# Patient Record
Sex: FEMALE | Race: WHITE | Employment: FULL TIME
[De-identification: names, ages, dates, MRNs, and addresses within clinical notes are randomized per-mention and may not be internally consistent; named-entity substitution may affect disease eponyms.]

---

## 2017-06-10 ENCOUNTER — HEALTH MAINTENANCE LETTER (OUTPATIENT)
Age: 22
End: 2017-06-10

## 2021-03-03 ENCOUNTER — HOSPITAL ENCOUNTER (EMERGENCY)
Facility: CLINIC | Age: 26
Discharge: HOME | End: 2021-03-03
Attending: EMERGENCY MEDICINE | Admitting: EMERGENCY MEDICINE
Payer: COMMERCIAL

## 2021-03-03 ENCOUNTER — APPOINTMENT (OUTPATIENT)
Dept: ULTRASOUND IMAGING | Facility: CLINIC | Age: 26
End: 2021-03-03
Attending: EMERGENCY MEDICINE
Payer: COMMERCIAL

## 2021-03-03 VITALS
SYSTOLIC BLOOD PRESSURE: 114 MMHG | RESPIRATION RATE: 18 BRPM | OXYGEN SATURATION: 97 % | TEMPERATURE: 98.8 F | DIASTOLIC BLOOD PRESSURE: 83 MMHG | HEART RATE: 91 BPM

## 2021-03-03 LAB
B-HCG SERPL-ACNC: ABNORMAL IU/L (ref 0–5)
BASOPHILS # BLD AUTO: 0 10E9/L (ref 0–0.2)
BASOPHILS NFR BLD AUTO: 0.3 %
DIFFERENTIAL METHOD BLD: ABNORMAL
EOSINOPHIL # BLD AUTO: 0.1 10E9/L (ref 0–0.7)
EOSINOPHIL NFR BLD AUTO: 0.5 %
ERYTHROCYTE [DISTWIDTH] IN BLOOD BY AUTOMATED COUNT: 12.3 % (ref 10–15)
HCT VFR BLD AUTO: 38.5 % (ref 35–47)
HGB BLD-MCNC: 12.6 G/DL (ref 11.7–15.7)
IMM GRANULOCYTES # BLD: 0.1 10E9/L (ref 0–0.4)
IMM GRANULOCYTES NFR BLD: 0.5 %
LYMPHOCYTES # BLD AUTO: 2.7 10E9/L (ref 0.8–5.3)
LYMPHOCYTES NFR BLD AUTO: 23.3 %
MCH RBC QN AUTO: 29.6 PG (ref 26.5–33)
MCHC RBC AUTO-ENTMCNC: 32.7 G/DL (ref 31.5–36.5)
MCV RBC AUTO: 91 FL (ref 78–100)
MONOCYTES # BLD AUTO: 1 10E9/L (ref 0–1.3)
MONOCYTES NFR BLD AUTO: 8.8 %
NEUTROPHILS # BLD AUTO: 7.8 10E9/L (ref 1.6–8.3)
NEUTROPHILS NFR BLD AUTO: 66.6 %
NRBC # BLD AUTO: 0 10*3/UL
NRBC BLD AUTO-RTO: 0 /100
PLATELET # BLD AUTO: 200 10E9/L (ref 150–450)
RBC # BLD AUTO: 4.25 10E12/L (ref 3.8–5.2)
WBC # BLD AUTO: 11.7 10E9/L (ref 4–11)

## 2021-03-03 PROCEDURE — 86901 BLOOD TYPING SEROLOGIC RH(D): CPT | Performed by: EMERGENCY MEDICINE

## 2021-03-03 PROCEDURE — 86870 RBC ANTIBODY IDENTIFICATION: CPT | Performed by: EMERGENCY MEDICINE

## 2021-03-03 PROCEDURE — 99284 EMERGENCY DEPT VISIT MOD MDM: CPT | Mod: 25

## 2021-03-03 PROCEDURE — 85025 COMPLETE CBC W/AUTO DIFF WBC: CPT | Performed by: EMERGENCY MEDICINE

## 2021-03-03 PROCEDURE — 84702 CHORIONIC GONADOTROPIN TEST: CPT | Performed by: EMERGENCY MEDICINE

## 2021-03-03 PROCEDURE — 86850 RBC ANTIBODY SCREEN: CPT | Performed by: EMERGENCY MEDICINE

## 2021-03-03 PROCEDURE — 76801 OB US < 14 WKS SINGLE FETUS: CPT

## 2021-03-03 PROCEDURE — 86900 BLOOD TYPING SEROLOGIC ABO: CPT | Performed by: EMERGENCY MEDICINE

## 2021-03-03 ASSESSMENT — ENCOUNTER SYMPTOMS
NAUSEA: 1
ABDOMINAL PAIN: 0

## 2021-03-04 LAB
ABO + RH BLD: ABNORMAL
ABO + RH BLD: ABNORMAL
BLD GP AB INVEST PLASRBC-IMP: ABNORMAL
BLD GP AB SCN SERPL QL: ABNORMAL
BLOOD BANK CMNT PATIENT-IMP: ABNORMAL
SPECIMEN EXP DATE BLD: ABNORMAL

## 2021-03-04 NOTE — ED TRIAGE NOTES
Pt presents to ED with c/o vaginal bleeding. 12 weeks pregnant currently. States it started about 30 minutes ago. Denies abdominal pain or light headedness. C/O slight nausea. Has history of subchorionic hemorrhage and bleeding resolved since that time.

## 2021-03-04 NOTE — ED PROVIDER NOTES
History   Chief Complaint:  Vaginal Bleeding      HPI   Daysi Dove () is a 25 year old female, currently 12 weeks pregnant, with a history of subchorionic hemorrhage, who presents to the ED for evaluation of vaginal bleeding. The patient reports having a history of subchorionic hemorrhage, which the bleeding for has resolved since. However, about 30 mins prior to arrival, patient began to bleed vaginally again. Also complains of some nausea. Patient denies any abdominal pain, vaginal pain, or clots in the blood. Of note, received a RhoGAM shot on .    Review of Systems   Gastrointestinal: Positive for nausea. Negative for abdominal pain.   Genitourinary: Positive for vaginal bleeding. Negative for vaginal pain.   All other systems reviewed and are negative.    Allergies:  No Known Drug Allergies    Medications:    The patient is not currently taking any prescribed medications.    Past Medical History:    Septate uterus    Past Surgical History:    Knee surgery    Family History:    Spontaneous abortions    Social History:  Marital Status: Single  PCP: Lula Pederson  Presents to the ED with friend    Physical Exam     Patient Vitals for the past 24 hrs:   BP Temp Temp src Pulse Resp SpO2   21 -- 98.8  F (37.1  C) Temporal -- -- 100 %   21 124/84 -- -- 109 18 --       Physical Exam      Eyes: periorbital tissue and sclera normal  Neck: supple  CV: ppi, regular   Resp: speaking in full sentences without any resp distress  Abd: Soft nontender nondistended, gravid appropriate for dates  Ext: peripheral edema present:  No  Skin: warm dry well perfused  Neuro: Alert, no gross motor or sensory deficits,  gait stable          Emergency Department Course   Imaging:  Radiology findings were communicated with the patient who voiced understanding of the findings.    US OB <14 Weeks with Single:  1. Single living intrauterine gestation at 12 weeks 2 days, EDC 2021.   2. Two small  subchorionic hemorrhages.    Reading per radiology    Laboratory:  Laboratory findings were communicated with the patient who voiced understanding of the findings.    CBC: WBC: 11.7 (H), HGB: 12.6, PLT: 200  HCG quantitative pregnancy: 78,319 (H)  ABO/Rh Type and Screen: A/Neg, Antibody screen: Positive     Emergency Department Course:    Reviewed:  I reviewed the patient's nursing notes, vitals, past medical records, Care Everywhere.     Assessments:  2153 I first assessed the patient and performed an exam. Discussed plans for care.    2317 I rechecked the patient and updated them on their results. Discussed plans for discharge.    Consults:   2207 I spoke with Dr. Dill of the OB-GYN service from [facility] regarding patient's presentation, findings, and plan of care.    Disposition:  The patient was discharged to home.       Impression & Plan      Medical Decision Making:   Daysi Dove is a 25 year old female who presents with vaginal bleeding in the setting of being 12 weeks pregnant.  No assisted reproductive medications or procedures making heterotopic quite unlikely.  Ultrasound showing a 12-week fetus with 2 small subchorionic hemorrhages.  No significant abdominal pain no concern for significant blood loss requiring transfusion or admission.  She is Rh- was given RhoGam 5 weeks ago discussed with OB no indication for repeat dosing.    Diagnosis:     ICD-10-CM    1. Subchorionic hemorrhage of placenta in first trimester, single or unspecified fetus  O41.8X10     O46.8X1        Disposition:   Discharged to home.    Scribe Disclosure:  I, Dana Gruber, am serving as a scribe on 3/3/2021 at 9:53 PM to personally document services performed by Blanco Parks MD based on my observations and the provider's statements to me.           Blanco Parks MD  03/04/21 2560

## 2021-03-04 NOTE — DISCHARGE INSTRUCTIONS
Return to ER immediately if you develop: worsening bleeding (>1 pad or tampon per hour), severe abdominal or pelvic pain, new lightheadedness/dizziness/shortness of breath, Fever > 101, persistent nausea or vomiting OR you have any other concerns about your health.

## 2023-08-26 ENCOUNTER — HEALTH MAINTENANCE LETTER (OUTPATIENT)
Age: 28
End: 2023-08-26